# Patient Record
Sex: FEMALE | Race: OTHER | NOT HISPANIC OR LATINO | URBAN - METROPOLITAN AREA
[De-identification: names, ages, dates, MRNs, and addresses within clinical notes are randomized per-mention and may not be internally consistent; named-entity substitution may affect disease eponyms.]

---

## 2017-08-08 ENCOUNTER — EMERGENCY (EMERGENCY)
Facility: HOSPITAL | Age: 27
LOS: 1 days | Discharge: HOME | End: 2017-08-08
Admitting: EMERGENCY MEDICINE
Payer: COMMERCIAL

## 2017-08-08 VITALS
HEART RATE: 64 BPM | SYSTOLIC BLOOD PRESSURE: 95 MMHG | OXYGEN SATURATION: 100 % | RESPIRATION RATE: 16 BRPM | DIASTOLIC BLOOD PRESSURE: 63 MMHG | TEMPERATURE: 98 F

## 2017-08-08 PROCEDURE — 99284 EMERGENCY DEPT VISIT MOD MDM: CPT

## 2017-08-08 RX ORDER — CYCLOBENZAPRINE HYDROCHLORIDE 10 MG/1
1 TABLET, FILM COATED ORAL
Qty: 15 | Refills: 0 | OUTPATIENT
Start: 2017-08-08 | End: 2017-08-13

## 2017-08-08 NOTE — ED PROVIDER NOTE - MUSCULOSKELETAL MINIMAL EXAM
neck/back: no swelling, obvious deformity, no midline TTP, FROM with mild pain. b/l shoulders: no swelling, no deformity, positive mild TTP diffusely, no deformities palpated, FROM with pain of shoulder. all extremities with muscle strength that is 5/5. straight leg raise b/l and ambulating, both without difficulty. Sensation intact./TENDERNESS/no muscle tenderness/neck supple
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
## 2017-08-08 NOTE — ED PROVIDER NOTE - CHPI ED SYMPTOMS POS
worsening (with movement) b/l shoulder pain and stiffness and worsening (with movement) neck pain and stiffness/NECK TENDERNESS/PAIN

## 2017-08-08 NOTE — ED PROVIDER NOTE - QUALITY
deep pain/worsening (with movement) b/l shoulder pain and stiffness and worsening (with movement) neck pain and stiffness
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
